# Patient Record
Sex: MALE | Race: WHITE | NOT HISPANIC OR LATINO | URBAN - METROPOLITAN AREA
[De-identification: names, ages, dates, MRNs, and addresses within clinical notes are randomized per-mention and may not be internally consistent; named-entity substitution may affect disease eponyms.]

---

## 2023-12-22 ENCOUNTER — OFFICE VISIT (OUTPATIENT)
Dept: URGENT CARE | Facility: URGENT CARE | Age: 43
End: 2023-12-22
Payer: COMMERCIAL

## 2023-12-22 VITALS
HEART RATE: 87 BPM | SYSTOLIC BLOOD PRESSURE: 134 MMHG | RESPIRATION RATE: 16 BRPM | TEMPERATURE: 101.4 F | DIASTOLIC BLOOD PRESSURE: 83 MMHG | OXYGEN SATURATION: 98 % | WEIGHT: 188.6 LBS

## 2023-12-22 DIAGNOSIS — R11.2 NAUSEA AND VOMITING, UNSPECIFIED VOMITING TYPE: ICD-10-CM

## 2023-12-22 DIAGNOSIS — R05.1 ACUTE COUGH: ICD-10-CM

## 2023-12-22 DIAGNOSIS — J10.1 INFLUENZA A: Primary | ICD-10-CM

## 2023-12-22 DIAGNOSIS — K22.6 MALLORY-WEISS TEAR: ICD-10-CM

## 2023-12-22 LAB
FLUAV AG SPEC QL IA: POSITIVE
FLUBV AG SPEC QL IA: NEGATIVE

## 2023-12-22 PROCEDURE — 99203 OFFICE O/P NEW LOW 30 MIN: CPT | Performed by: PHYSICIAN ASSISTANT

## 2023-12-22 PROCEDURE — 87804 INFLUENZA ASSAY W/OPTIC: CPT | Performed by: PHYSICIAN ASSISTANT

## 2023-12-22 RX ORDER — BUPROPION HYDROCHLORIDE 150 MG/1
TABLET ORAL
COMMUNITY
Start: 2023-08-08

## 2023-12-22 RX ORDER — ONDANSETRON 4 MG/1
4 TABLET, ORALLY DISINTEGRATING ORAL ONCE
Status: COMPLETED | OUTPATIENT
Start: 2023-12-22 | End: 2023-12-22

## 2023-12-22 RX ORDER — DEXTROAMPHETAMINE SACCHARATE, AMPHETAMINE ASPARTATE MONOHYDRATE, DEXTROAMPHETAMINE SULFATE AND AMPHETAMINE SULFATE 7.5; 7.5; 7.5; 7.5 MG/1; MG/1; MG/1; MG/1
30 CAPSULE, EXTENDED RELEASE ORAL DAILY
COMMUNITY

## 2023-12-22 RX ORDER — ONDANSETRON 4 MG/1
4 TABLET, ORALLY DISINTEGRATING ORAL EVERY 8 HOURS PRN
Qty: 9 TABLET | Refills: 0 | Status: SHIPPED | OUTPATIENT
Start: 2023-12-22 | End: 2023-12-25

## 2023-12-22 RX ORDER — OSELTAMIVIR PHOSPHATE 75 MG/1
75 CAPSULE ORAL 2 TIMES DAILY
Qty: 10 CAPSULE | Refills: 0 | Status: SHIPPED | OUTPATIENT
Start: 2023-12-22 | End: 2023-12-27

## 2023-12-22 RX ADMIN — ONDANSETRON 4 MG: 4 TABLET, ORALLY DISINTEGRATING ORAL at 15:54

## 2023-12-22 ASSESSMENT — PAIN SCALES - GENERAL: PAINLEVEL: SEVERE PAIN (7)

## 2023-12-22 NOTE — PROGRESS NOTES
"  Chief Complaint   Patient presents with    Cold Symptoms     Cough, congestion, body aches, \"shooting pain in muscles\" beginning yesterday. Negative at-home Covid test.                     ASSESSMENT:     ICD-10-CM    1. Influenza A  J10.1 oseltamivir (TAMIFLU) 75 MG capsule     ondansetron (ZOFRAN ODT) 4 MG ODT tab      2. Acute cough  R05.1 Influenza A/B antigen     ondansetron (ZOFRAN ODT) ODT tab 4 mg     oseltamivir (TAMIFLU) 75 MG capsule     ondansetron (ZOFRAN ODT) 4 MG ODT tab      3. Nausea and vomiting, unspecified vomiting type  R11.2 ondansetron (ZOFRAN ODT) ODT tab 4 mg     oseltamivir (TAMIFLU) 75 MG capsule     ondansetron (ZOFRAN ODT) 4 MG ODT tab      4. 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  Influenza A.  Tamiflu.  Zofran.  To ER if becomes hydrated or cannot keep food or liquids down.  I have discussed clinical findings with patient.  Side effects of medications discussed.  Symptomatic care is discussed.  I have discussed the possibility of  worsening symptoms and indication to RTC or go to the ER if they occur.  All questions are answered, patient indicates understanding of these issues and is in agreement with plan.   Patient care instructions are discussed/given at the end of visit.   Lots of rest and fluids.  History of Lucia Fernandez tear.  He states this feels totally different.  If any blood in vomit go to ER immediately.    Melia Sal PA-C      SUBJECTIVE:  43-year-old presents for acute onset of  Vomiting, body aches, congestion that started yesterday.  Negative home COVID test.  Denies any alcohol intake.  No history of alcohol use disorder.  Does have history of Lucia-Fernandez tear, he states this feels different.  No blood in his vomit.  Also with some sore throat.    Allergies   Allergen Reactions    Latex Rash       No past medical history on file.    amphetamine-dextroamphetamine (ADDERALL XR) 30 MG 24 hr capsule, Take 30 mg by mouth " daily  buPROPion (WELLBUTRIN XL) 150 MG 24 hr tablet,     No current facility-administered medications on file prior to visit.      Social History     Tobacco Use    Smoking status: Every Day     Types: Vaping Device     Passive exposure: Never    Smokeless tobacco: Never   Substance Use Topics    Alcohol use: Not on file       ROS:  CONSTITUTIONAL: Negative for fatigue or fever.  EYES: Negative for eye problems.  ENT: As above.  RESP: As above.  CV: Negative for chest pains.  GI: Negative for vomiting.  MUSCULOSKELETAL:  Negative for significant muscle or joint pains.  NEUROLOGIC: Negative for headaches.  SKIN: Negative for rash.  PSYCH: Normal mentation for age.    OBJECTIVE:  /83 (BP Location: Left arm, Patient Position: Sitting, Cuff Size: Adult Regular)   Pulse 87   Temp (!) 101.4  F (38.6  C) (Tympanic)   Resp 16   Wt 85.5 kg (188 lb 9.6 oz)   SpO2 98%   GENERAL APPEARANCE: Vomiting in the room.    EYES:Conjunctiva/sclera clear.  EARS: No cerumen.   Ear canals w/o erythema.  TM's intact w/o erythema.    THROAT: Mild erythema w/o tonsillar enlargement . No exudates.  NECK: Supple, nontender, no lymphadenopathy.  RESP: Lungs clear to auscultation - no rales, rhonchi or wheezes  CV: Regular rate and rhythm, normal S1 S2, no murmur noted.  NEURO: Awake, alert    SKIN: No rashes    Results for orders placed or performed in visit on 12/22/23   Influenza A/B antigen     Status: Abnormal    Specimen: Nose; Swab   Result Value Ref Range    Influenza A antigen Positive (A) Negative    Influenza B antigen Negative Negative    Narrative    Test results must be correlated with clinical data. If necessary, results should be confirmed by a molecular assay or viral culture.       Melia Sal PA-C

## 2025-07-15 ENCOUNTER — OFFICE VISIT (OUTPATIENT)
Dept: URGENT CARE | Facility: URGENT CARE | Age: 45
End: 2025-07-15
Payer: COMMERCIAL

## 2025-07-15 VITALS
HEART RATE: 72 BPM | HEIGHT: 68 IN | DIASTOLIC BLOOD PRESSURE: 85 MMHG | TEMPERATURE: 97.9 F | OXYGEN SATURATION: 99 % | BODY MASS INDEX: 31.98 KG/M2 | WEIGHT: 211 LBS | SYSTOLIC BLOOD PRESSURE: 134 MMHG | RESPIRATION RATE: 18 BRPM

## 2025-07-15 DIAGNOSIS — M54.50 ACUTE RIGHT-SIDED LOW BACK PAIN WITHOUT SCIATICA: Primary | ICD-10-CM

## 2025-07-15 PROCEDURE — 3075F SYST BP GE 130 - 139MM HG: CPT | Performed by: NURSE PRACTITIONER

## 2025-07-15 PROCEDURE — 99214 OFFICE O/P EST MOD 30 MIN: CPT | Mod: 25 | Performed by: NURSE PRACTITIONER

## 2025-07-15 PROCEDURE — 96372 THER/PROPH/DIAG INJ SC/IM: CPT | Performed by: NURSE PRACTITIONER

## 2025-07-15 PROCEDURE — 1125F AMNT PAIN NOTED PAIN PRSNT: CPT | Performed by: NURSE PRACTITIONER

## 2025-07-15 PROCEDURE — 3079F DIAST BP 80-89 MM HG: CPT | Performed by: NURSE PRACTITIONER

## 2025-07-15 RX ORDER — NAPROXEN 500 MG/1
500 TABLET ORAL EVERY 12 HOURS
Qty: 14 TABLET | Refills: 0 | Status: SHIPPED | OUTPATIENT
Start: 2025-07-15

## 2025-07-15 RX ORDER — CYCLOBENZAPRINE HCL 5 MG
5-10 TABLET ORAL EVERY 8 HOURS PRN
Qty: 20 TABLET | Refills: 0 | Status: SHIPPED | OUTPATIENT
Start: 2025-07-15

## 2025-07-15 RX ORDER — KETOROLAC TROMETHAMINE 30 MG/ML
30 INJECTION, SOLUTION INTRAMUSCULAR; INTRAVENOUS ONCE
Status: COMPLETED | OUTPATIENT
Start: 2025-07-15 | End: 2025-07-15

## 2025-07-15 RX ADMIN — KETOROLAC TROMETHAMINE 30 MG: 30 INJECTION, SOLUTION INTRAMUSCULAR; INTRAVENOUS at 10:57

## 2025-07-15 ASSESSMENT — PAIN SCALES - GENERAL: PAINLEVEL_OUTOF10: MODERATE PAIN (6)

## 2025-07-15 NOTE — PROGRESS NOTES
Assessment & Plan     Acute right-sided low back pain without sciatica    - ketorolac (TORADOL) injection 30 mg     Reviewed 8/16/10 ED note for back pain.     He is given 30 mg IM ketorolac during visit for acute on chronic back pain with some relief    Go to chiropractor today    Recommended rest, walking, gentle stretching, ice, heat, salonpas over the counter patch or cream, tylenol as needed.     Prescription sent to pharmacy for Naproxen 500 mg every 12 hours with food for 7 days, do not take with other NSAIDs, do not take until after 7pm today due to ketorolac injection    Prescription sent to pharmacy for Cyclobenzaprine 5-10 mg every 8 hours as needed, potential side effects discussed, do not take before driving or operating machinery.     No red flag signs currently. Patient neurologically stable currently.     Follow-up with PCP if symptoms persist for 7 days, and sooner if symptoms worsen or new symptoms develop.     Discussed red flag symptoms which warrant immediate visit in emergency room    All questions were answered and patient verbalized understanding. AVS reviewed with patient.     Alba Nogueira, DNP, APRN, CNP 7/15/2025 11:06 AM  [facility] 45 [facility] who presents to clinic today for the following health issues:  Chief Complaint   Patient presents with    Back Pain     Back started locking up as pt was going to bathroom today - extremely painful          7/15/2025    10:29 AM   Additional Questions   Roomed by Norma   Accompanied by Self         7/15/2025   Forms   Any forms needing to be completed Yes         Back Pain    Onset of symptoms was this morning  Location: right low back  Radiation: upper back  Context: No known injury, happened while seated and going to bathroom  Course of symptoms is waxing and waning.    Severity moderate, gets severe spasms  Current and Associated symptoms: pain  Denies: fecal incontinence,  "urinary incontinence, lower extremity numbness, lower extremity weakness, and paresthesia    Denies dysuria, hematuria  Aggravating Factors: changing position  Therapies to improve symptoms include: none  Past history: recurrent self limited episodes of low back pain in the past  Went to the chiropractor and was told to come here to be seen first  No history of kidney disease.   Steroid injections have not helped back pain in the past but stretches have and he stopped his stretches  Was in physical therapy for back pain in 2013  Was evaluated in ED for low back pain 8/16/10 and prescribed Robaxin, ibuprofen and Percocet  His job is very physical and active        Objective    /85 (BP Location: Left arm, Patient Position: Sitting, Cuff Size: Adult Large)   Pulse 72   Temp 97.9  F (36.6  C) (Oral)   Resp 18   Ht 1.727 m (5' 8\")   Wt 95.7 kg (211 lb)   SpO2 99%   BMI 32.08 kg/m    Physical Exam  Constitutional:       General: He is not in acute distress.     Appearance: He is not toxic-appearing or diaphoretic.   Abdominal:      Tenderness: There is no right CVA tenderness or left CVA tenderness.   Musculoskeletal:      Comments: No spinal or paraspinal tenderness with palpation. Negative straight leg raise bilateral. Decreased ROM lumbar flexion, extension, left lateral flexion   Skin:     General: Skin is warm and dry.      Findings: No bruising or rash.   Neurological:      General: No focal deficit present.      Mental Status: He is alert and oriented to person, place, and time.      Cranial Nerves: No cranial nerve deficit.      Sensory: No sensory deficit.      Motor: No weakness.      Gait: Gait abnormal.      Comments: Guarded gait with slow to change positions but able to walk on toes, heels, straight line walk                "

## 2025-07-15 NOTE — PATIENT INSTRUCTIONS
Go to chiropractor today    Recommended rest, walking, gentle stretching, ice, heat, salonpas over the counter patch or cream, tylenol as needed.     Prescription sent to pharmacy for Naproxen 500 mg every 12 hours with food for 7 days, do not take with other NSAIDs, do not take until after 7pm today due to ketorolac injection    Prescription sent to pharmacy for Cyclobenzaprine 5-10 mg every 8 hours as needed, potential side effects discussed, do not take before driving or operating machinery.

## 2025-07-15 NOTE — LETTER
July 15, 2025      Lew Oneiljean  [address] [place]  [place] [facility] [address]        To Whom It May Concern:    Lew Mcclure  was seen on 7/15/25.  Please excuse him 7/15 and 7/16 due to back pain.       Sincerely,        SHAWNA Willis    Electronically signed

## 2025-07-15 NOTE — PROGRESS NOTES
Urgent Care Clinic Visit    Chief Complaint   Patient presents with    Back Pain     Back started locking up as pt was going to bathroom today - extremely painful                7/15/2025    10:29 AM   Additional Questions   Roomed by Norma   Accompanied by Self         7/15/2025   Forms   Any forms needing to be completed Yes